# Patient Record
Sex: FEMALE | Race: BLACK OR AFRICAN AMERICAN | Employment: FULL TIME | URBAN - METROPOLITAN AREA
[De-identification: names, ages, dates, MRNs, and addresses within clinical notes are randomized per-mention and may not be internally consistent; named-entity substitution may affect disease eponyms.]

---

## 2018-06-22 ENCOUNTER — OFFICE VISIT (OUTPATIENT)
Dept: ORTHOPEDIC SURGERY | Facility: CLINIC | Age: 38
End: 2018-06-22

## 2018-06-22 VITALS
OXYGEN SATURATION: 97 % | HEART RATE: 90 BPM | SYSTOLIC BLOOD PRESSURE: 145 MMHG | TEMPERATURE: 97.9 F | WEIGHT: 231 LBS | DIASTOLIC BLOOD PRESSURE: 95 MMHG

## 2018-06-22 DIAGNOSIS — M22.2X1 PATELLOFEMORAL SYNDROME, BILATERAL: Primary | ICD-10-CM

## 2018-06-22 DIAGNOSIS — M72.2 PLANTAR FASCIITIS, LEFT: ICD-10-CM

## 2018-06-22 DIAGNOSIS — M22.2X2 PATELLOFEMORAL SYNDROME, BILATERAL: Primary | ICD-10-CM

## 2018-06-22 DIAGNOSIS — M76.892 PES ANSERINUS TENDINITIS OF LEFT LOWER EXTREMITY: ICD-10-CM

## 2018-06-22 NOTE — PROGRESS NOTES
Patient: Lisa Moran                MRN: [id number]       SSN: [id number]  YOB: 1980        AGE: 40 y.o. SEX: female  There is no height or weight on file to calculate BMI. PCP: No primary care provider on file.  06/22/18    Chief Complaint: Bilateral knee pain    HISTORY OF PRESENT ILLNESS:  Lisa Moran is a 44-year-old, female who comes to the office today for bilateral knee pain, left worse than right. She said that her left knee has been bothering her for about two weeks and her right knee for about two months. She denies any injury or trauma. Her knees hurt for when she is sitting for long periods of time or when she is going up and down stairs or getting up from a seated position. It also bothers her at work when she is walking around. In the left knee, she describes a deep pain as well as an anteromedial pain in her knee. In the right knee, it is a deep pain in the nape. She rates the left knee as 7/10, and the right knee is 4/10. She has not had any formal treatment. No physical therapy. No injections. No x-rays or MRIs. No past medical history on file. No family history on file. No Known Allergies    No past surgical history on file. Social History     Social History    Marital status:      Spouse name: N/A    Number of children: N/A    Years of education: N/A     Occupational History    Not on file.      Social History Main Topics    Smoking status: Never Smoker    Smokeless tobacco: Never Used    Alcohol use Not on file    Drug use: Not on file    Sexual activity: Not on file     Other Topics Concern    Not on file     Social History Narrative    No narrative on file       REVIEW OF SYSTEMS:      CON: negative for recent weight loss/gain, fever, or chills  EYE: negative for double or blurry vision  ENT: negative for hoarseness  RS:   negative for cough, URI, SOB  CV:  negative for chest pain, palpitations  GI: negative for blood in stool, nausea/vomiting  :  negative for blood in urine  MS: As per HPI  Other systems reviewed and noted below. PHYSICAL EXAMINATION:  Visit Vitals    BP (!) 145/95 (BP 1 Location: Left arm)    Pulse 90    Temp 97.9 °F (36.6 °C)    Wt 231 lb (104.8 kg)    SpO2 97%     There is no height or weight on file to calculate BMI. GENERAL: Alert and oriented x3, in no acute distress, well-developed, well-nourished. HEENT: Normocephalic, atraumatic. RESP: Non labored breathing with equal chest rise on inspiration. CV: Well perfused extremities. No cyanosis or clubbing noted. ABDOMEN: Soft, non-tender, non-distended. PHYSICAL EXAMINATION:  Right knee reveals full range of motion. No warmth or erythema or effusion. Mild crepitus with knee patella range of motion. No pain with patella compression, nontender over the mediolateral joint line. No instability is appreciated. Mild pain with patellar compression with knee flexion and extension. Left knee examination reveals mild tenderness to palpation over the medial joint line as well as the pes insertion. No crepitus with knee range of motion. No patella instability. Mild pain with patella compression as well as patella compression with knee range of motion. She has full knee range of motion without any instability. She is neurovascularly intact distally. Examination of the left foot reveals some tenderness to palpation over the plantar fascia. ASSESSMENT AND PLAN:  Yenifer Stoner is a 71-year-old female with bilateral patellofemoral syndrome, left knee pes insertional tendonitis as well as left plantar fasciitis. I have recommended physical therapy for these at todays date. I would not recommend any advanced imaging or x-rays at todays visit. I would like to see her back in about six weeks after physical therapy to see how she is doing.             Electronically signed by: Lauren Ontiveros MD

## 2018-07-02 ENCOUNTER — HOSPITAL ENCOUNTER (OUTPATIENT)
Dept: PHYSICAL THERAPY | Age: 38
Discharge: HOME | End: 2018-07-02
Payer: COMMERCIAL

## 2018-07-02 PROCEDURE — 97110 THERAPEUTIC EXERCISES: CPT | Performed by: PHYSICAL THERAPIST

## 2018-07-02 PROCEDURE — 97162 PT EVAL MOD COMPLEX 30 MIN: CPT | Performed by: PHYSICAL THERAPIST

## 2018-07-02 NOTE — PROGRESS NOTES
[facility] [address] [facility] [address], [address] [phone] fax    Plan of Care/ Statement of Necessity for Physical Therapy Services    Patient name: Cally Ambriz Start of Care: 2018   Referral source: Barbara Cadet MD : 1980    Medical Diagnosis: Plantar fascial fibromatosis [M72.2]  Left knee pain [M25.562]   Onset Date: 2 months    Treatment Diagnosis:  Right > Left Patellofemoral syndrome; left plantar fasciitis   Prior Hospitalization: see medical history Provider#: [id number]   Medications: Verified on Patient summary List    Comorbidities: BMI > 30   Prior Level of Function: (I) walking and squatting without pain         The Plan of Care and following information is based on the information from the initial evaluation. Assessment/ key information: 39 yo female with insidious onset left > right knee pain 2 months ago. About the same time she noted left heel pain that is worse upon waking and with prolonged sitting. Knee pain limits ability to walk for longer periods, negotiate stairs (25 steps in her house), and she is limited with ability to exercise to lose weight. Eval reveals good strength in both LEs with exception of weakness in bilateral hip extensors/adductors: 3+/5. AROM in left knee is 0-120 in supine versus rigth  0-125. Single limb squatting reveals valgus collapse on right. Decrease in overall pain with stretching alone so patient is a good candidate for skilled therapy to increase ROM and strength in both hips and knees to promote unlimited walking, stairs, squatting, and participation in regular ex program to lose weight.     Evaluation Complexity History MEDIUM  Complexity : 1-2 comorbidities / personal factors will impact the outcome/ POC ; Examination MEDIUM Complexity : 3 Standardized tests and measures addressing body structure, function, activity limitation and / or participation in recreation  ;Presentation LOW Complexity : Stable, uncomplicated  ;Clinical Decision Making MEDIUM Complexity : FOTO score of 26-74  Overall Complexity Rating: MEDIUM  Problem List: pain affecting function, decrease ROM, decrease strength, edema affecting function, impaired gait/ balance, decrease ADL/ functional abilitiies, decrease activity tolerance and decrease flexibility/ joint mobility   Treatment Plan may include any combination of the following: Therapeutic exercise, Therapeutic activities, Neuromuscular re-education, Physical agent/modality, Manual therapy and Patient education  Patient / Family readiness to learn indicated by: asking questions, trying to perform skills and interest  Persons(s) to be included in education: patient (P)  Barriers to Learning/Limitations: None  Patient Goal (s): to have no more pain  Patient Self Reported Health Status: fair  Rehabilitation Potential: good  Short Term Goals: To be accomplished in 1 weeks:  Patient will be (I) and compliant with simple HEP to restore flexibility in left ankle and strength in both knees to allow walking with no >4/10 pain  Status at last note: na  Long Term Goals: To be accomplished in 12-18 treatments:  1. Patient will be able to navigate up/down 25 steps using step over step pattern  in home with no more than 2/10 pain  Status at last note: limits going to 2nd floor (children's bedrooms on 2nd floor - must use step to step when painful  2. Patient will be have 0-125 AROM right knee to allow her to squat to pick things up off floor and sit comfortably  Status at last note: right knee AROM: 0-120 - unable to squat  3. Patient will report at least 75% improvement allowing her to participate in regular ex program to lose weight  Status at last note: na  4.   Patient will demonstrate an increase in FOTO score to at least 68 points to show functional gains in all areas  Status at last note:  FOTO: 47 points    Frequency / Duration: Patient to be seen 2-3  times per week for 12-18 treatments. Patient/ Caregiver education and instruction: Diagnosis, prognosis, self care, activity modification and exercises   [x]  Plan of care has been reviewed with PTA    The severity rating is based on clinical judgment and the FOTO score. Certification Period: nakia Galeano, PT 7/2/2018 6:30 PM    ________________________________________________________________________    I certify that the above Therapy Services are being furnished while the patient is under my care. I agree with the treatment plan and certify that this therapy is necessary.     Physician's Signature:____________________  Date:____________Time: _________    Please sign and return to [facility] [address] [place], [address] [phone] fax

## 2018-07-02 NOTE — PROGRESS NOTES
PT DAILY TREATMENT NOTE     Patient Name: Mimi Leahy  Date:2018  : 1980  [x]  Patient  Verified  Payor: Delena Boeck / Plan: Leticia Zhao / Product Type: HMO /    In time:4:05  Out time:4:55  Total Treatment Time (min): 50  Visit #: 1 of     Treatment Area: Plantar fascial fibromatosis [M72.2]  Left knee pain [M25.562]    SUBJECTIVE  Pain Level (0-10 scale): 5  Any medication changes, allergies to medications, adverse drug reactions, diagnosis change, or new procedure performed?: [x] No    [] Yes (see summary sheet for update)  Subjective functional status/changes:   [] No changes reported  See POC    OBJECTIVE    30 min [x]Eval                  []Re-Eval       10 min Therapeutic Exercise:  [] See flow sheet : instructed in HEP - see chart   Rationale: increase strength to improve the patients ability to walk with less pain            With   [] TE   [] TA   [] neuro   [] other: Patient Education: [x] Review HEP    [] Progressed/Changed HEP based on:   [] positioning   [] body mechanics   [] transfers   [] heat/ice application    [] other:      Other Objective/Functional Measures:  See POC    Pain Level (0-10 scale) post treatment: 3    ASSESSMENT/Changes in Function:      This is eval note -see POC    PLAN  []  Upgrade activities as tolerated     []  Continue plan of care  []  Update interventions per flow sheet       []  Discharge due to:_  [x]  Other:_      Florin Mccall, PT 2018  4:05 PM    Future Appointments  Date Time Provider Andrew Wolff   2018 3:15 PM Catherine Rowe MD [facility] [address]

## 2018-07-06 ENCOUNTER — HOSPITAL ENCOUNTER (OUTPATIENT)
Dept: PHYSICAL THERAPY | Age: 38
Discharge: HOME | End: 2018-07-06
Payer: COMMERCIAL

## 2018-07-06 PROCEDURE — 97110 THERAPEUTIC EXERCISES: CPT

## 2018-07-06 PROCEDURE — 97112 NEUROMUSCULAR REEDUCATION: CPT

## 2018-07-06 NOTE — PROGRESS NOTES
PT DAILY TREATMENT NOTE     Patient Name: Belia Zarate  Date:2018  : 1980  [x]  Patient  Verified  Payor: Joie Harmon / Plan: Drake Lozada / Product Type: HMO /    In time:730  Out time:807  Total Treatment Time (min): 37  Visit #: 2 of 12    Treatment Area: Plantar fascial fibromatosis [M72.2]  Left knee pain [M25.562]    SUBJECTIVE  Pain Level (0-10 scale): 0  Any medication changes, allergies to medications, adverse drug reactions, diagnosis change, or new procedure performed?: [x] No    [] Yes (see summary sheet for update)  Subjective functional status/changes:   [x] No changes reported      OBJECTIVE    27 min Therapeutic Exercise:  [x] See flow sheet :   Rationale: increase ROM and increase strength to improve the patients ability to perform ADL's. 10 min Neuromuscular Re-education:  [x]  See flow sheet :   Rationale: increase ROM, increase strength, improve coordination, improve balance and increase proprioception  to improve the patients ability to perform functional tasks. With   [x] TE   [] TA   [] neuro   [] other: Patient Education: [x] Review HEP    [] Progressed/Changed HEP based on:   [] positioning   [] body mechanics   [] transfers   [] heat/ice application    [] other:      Other Objective/Functional Measures: Met shortterm goal of Indepenenc and compliance with HEP. Pain Level (0-10 scale) post treatment: 0    ASSESSMENT/Changes in Function: Pt performed standing exercise with ease. She did require breaks due to fatigue with table exercises. Discomfort was noted with TRX squats and step down exercise.     Patient will continue to benefit from skilled PT services to modify and progress therapeutic interventions, address functional mobility deficits, address ROM deficits, address strength deficits, analyze and address soft tissue restrictions, analyze and cue movement patterns, analyze and modify body mechanics/ergonomics, assess and modify postural abnormalities and address imbalance/dizziness to attain remaining goals. [x]  See Plan of Care  []  See progress note/recertification  []  See Discharge Summary         Progress towards goals / Updated goals:  Short Term Goals: To be accomplished in 1 weeks:  Patient will be (I) and compliant with simple HEP to restore flexibility in left ankle and strength in both knees to allow walking with no >4/10 pain  MET 7/6/2018  Status at last note: na  Long Term Goals: To be accomplished in 12-18 treatments:  1. Patient will be able to navigate up/down 25 steps using step over step pattern  in home with no more than 2/10 pain  Status at last note: limits going to 2nd floor (children's bedrooms on 2nd floor - must use step to step when painful  2. Patient will be have 0-125 AROM right knee to allow her to squat to pick things up off floor and sit comfortably  Status at last note: right knee AROM: 0-120 - unable to squat  3. Patient will report at least 75% improvement allowing her to participate in regular ex program to lose weight  Status at last note: na  4.   Patient will demonstrate an increase in FOTO score to at least 68 points to show functional gains in all areas  Status at last note:  FOTO: 47 points    PLAN  []  Upgrade activities as tolerated     [x]  Continue plan of care  []  Update interventions per flow sheet       []  Discharge due to:_  []  Other:_      Dee Cain 7/6/2018  8:10 AM    Future Appointments  Date Time Provider Andrew Wolff   7/11/2018 4:30 PM Juliette Byrd, PT [facility]   7/13/2018 5:00 PM Jimi Causey PTA [facility]   7/17/2018 5:00 PM Juliette Byrd PT [facility]   7/20/2018 4:30 PM Jimi Causey PTA [facility]   7/24/2018 7:30 AM Meena Larry PT [facility]   7/26/2018 4:30 PM Jimi Causey PTA [facility]   7/31/2018 7:30 AM [facility]   8/24/2018 3:15 PM MD [facility] [address]

## 2018-07-11 ENCOUNTER — APPOINTMENT (OUTPATIENT)
Dept: PHYSICAL THERAPY | Age: 38
End: 2018-07-11
Payer: COMMERCIAL

## 2018-07-13 ENCOUNTER — HOSPITAL ENCOUNTER (OUTPATIENT)
Dept: PHYSICAL THERAPY | Age: 38
Discharge: HOME | End: 2018-07-13
Payer: COMMERCIAL

## 2018-07-13 PROCEDURE — 97112 NEUROMUSCULAR REEDUCATION: CPT

## 2018-07-13 PROCEDURE — 97110 THERAPEUTIC EXERCISES: CPT

## 2018-07-13 NOTE — PROGRESS NOTES
PT DAILY TREATMENT NOTE     Patient Name: Brittani Montalvo  Date:2018  : 1980  [x]  Patient  Verified  Payor: Claudeen Lao / Plan: Yulissa Johns / Product Type: HMO /    In time:459  Out time:524  Total Treatment Time (min): 25  Visit #: 3 of 12    Treatment Area: Plantar fascial fibromatosis [M72.2]  Left knee pain [M25.562]    SUBJECTIVE  Pain Level (0-10 scale): 1  Any medication changes, allergies to medications, adverse drug reactions, diagnosis change, or new procedure performed?: [x] No    [] Yes (see summary sheet for update)  Subjective functional status/changes:   [] No changes reported  \"I hit my knee on my desk at work today. \"    OBJECTIVE    Modality rationale: patient declined   Min Type Additional Details    [] Estim:  []Unatt       []IFC  []Premod                        []Other:  []w/ice   []w/heat  Position:  Location:    [] Estim: []Att    []TENS instruct  []NMES                    []Other:  []w/US   []w/ice   []w/heat  Position:  Location:    []  Traction: [] Cervical       []Lumbar                       [] Prone          []Supine                       []Intermittent   []Continuous Lbs:  [] before manual  [] after manual    []  Ultrasound: []Continuous   [] Pulsed                           []1MHz   []3MHz W/cm2:  Location:    []  Iontophoresis with dexamethasone         Location: [] Take home patch   [] In clinic    []  Ice     []  heat  []  Ice massage  []  Laser   []  Anodyne Position:  Location:    []  Laser with stim  []  Other:  Position:  Location:    []  Vasopneumatic Device Pressure:       [] lo [] med [] hi   Temperature: [] lo [] med [] hi   [] Skin assessment post-treatment:  []intact []redness- no adverse reaction    []redness  adverse reaction:     10 min Therapeutic Exercise:  [x] See flow sheet :   Rationale: increase ROM and increase strength to improve the patients ability to perform ADLs    15 min Neuromuscular Re-education:  [x]  See flow sheet : quad re-ed activities    Rationale: increase ROM, increase strength, improve coordination, improve balance and increase proprioception  to improve the patients ability to improve mobility, stance stability, and gait        With   [x] TE   [] TA   [x] neuro   [] other: Patient Education: [x] Review HEP    [] Progressed/Changed HEP based on:   [x] positioning   [x] body mechanics   [] transfers   [] heat/ice application    [] other:      Other Objective/Functional Measures:      Pain Level (0-10 scale) post treatment: 0    ASSESSMENT/Changes in Function: Pt is making great progress with her pain and ROM. She demonstrated good squatting mechanics. Patient will continue to benefit from skilled PT services to modify and progress therapeutic interventions, address functional mobility deficits, address ROM deficits, address strength deficits, analyze and address soft tissue restrictions, analyze and cue movement patterns, analyze and modify body mechanics/ergonomics, assess and modify postural abnormalities, address imbalance/dizziness and instruct in home and community integration to attain remaining goals. [x]  See Plan of Care  []  See progress note/recertification  []  See Discharge Summary         Progress towards goals / Updated goals:  Short Term Goals: To be accomplished in 1 weeks:  1. Patient will be (I) and compliant with simple HEP to restore flexibility in left ankle and strength in both knees to allow walking with no >4/10 pain   Status at last note: na   MET  Long Term Goals: To be accomplished in 12-18 treatments:  1.  Patient will be able to navigate up/down 25 steps using step over step pattern  in home with no more than 2/10 pain   Status at last note: limits going to 2nd floor (children's bedrooms on 2nd floor - must use step to step when painful   Improving with steps in the clinic  2. Raul Briseno will be have 0-125 AROM right knee to allow her to squat to pick things up off floor and sit comfortably   Status at last note: right knee AROM: 0-120 - unable to squat   Improving with squatting  3.  Patient will report at least 75% improvement allowing her to participate in regular ex program to lose weight   Status at last note: na   Assess at later visit  4.  Patient will demonstrate an increase in FOTO score to at least 68 points to show functional gains in all areas   Status at last note:  FOTO: 47 points   Assess at 5th visit    PLAN  []  Upgrade activities as tolerated     [x]  Continue plan of care  []  Update interventions per flow sheet       []  Discharge due to:_  []  Other:_      Jimi Causey PTA, CSCS 7/13/2018  5:28 PM    Future Appointments  Date Time Provider Andrew Casarezi   7/13/2018 5:00 PM Jimi Causey PTA [facility]   7/17/2018 5:00 PM Juliette Byrd, PT [facility]   7/20/2018 4:30 PM Jimi Causey PTA [facility]   7/24/2018 7:30 AM Meena Larry, PT [facility]   7/26/2018 4:30 PM Jimi Causey PTA [facility]   7/31/2018 7:30 AM Meena Larry, PT [facility]   8/24/2018 3:15 PM Janneth Odom MD [facility] [address] [address]

## 2018-07-17 ENCOUNTER — HOSPITAL ENCOUNTER (OUTPATIENT)
Dept: PHYSICAL THERAPY | Age: 38
Discharge: HOME | End: 2018-07-17
Payer: COMMERCIAL

## 2018-07-17 PROCEDURE — 97112 NEUROMUSCULAR REEDUCATION: CPT | Performed by: PHYSICAL THERAPIST

## 2018-07-17 PROCEDURE — 97110 THERAPEUTIC EXERCISES: CPT | Performed by: PHYSICAL THERAPIST

## 2018-07-17 NOTE — PROGRESS NOTES
PT DAILY TREATMENT NOTE     Patient Name: Krysten Necessary  Date:2018  : 1980  [x]  Patient  Verified  Payor: Roberto Vasquez / Plan: Isamar Torres / Product Type: HMO /    In time:512  Out time:543  Total Treatment Time (min): 31  Visit #: 4 of     Treatment Area: Plantar fascial fibromatosis [M72.2]  Left knee pain [M25.562]    SUBJECTIVE  Pain Level (0-10 scale): 0  Any medication changes, allergies to medications, adverse drug reactions, diagnosis change, or new procedure performed?: [x] No    [] Yes (see summary sheet for update)  Subjective functional status/changes:   [x] No changes reported      OBJECTIVE    21 min Therapeutic Exercise:  [] See flow sheet :   Rationale: increase ROM, increase strength, improve coordination, improve balance and increase proprioception to improve the patients ability to tolerate weightbearing tasks with improved mobility and stability. 10 min Neuromuscular Re-eduation:  [] See flow sheet :   Rationale: increase ROM, increase strength, improve coordination, improve balance and increase proprioception to improve the patients ability to tolerate weightbearing tasks with improved mobility and stability. With   [x] TE   [] TA   [] neuro   [] other: Patient Education: [x] Review HEP    [] Progressed/Changed HEP based on:   [] positioning   [] body mechanics   [] transfers   [] heat/ice application    [] other:      Other Objective/Functional Measures: Pt requires vC for new therapy tasks. Pain Level (0-10 scale) post treatment: 0    ASSESSMENT/Changes in Function: Pt tolerates session with no c/o pain during/throughout therapy tasks. Pt continues to progress with activity tolerance and R LE strength/stability.     Patient will continue to benefit from skilled PT services to modify and progress therapeutic interventions, address functional mobility deficits, address ROM deficits, address strength deficits, analyze and address soft tissue restrictions, analyze and cue movement patterns, analyze and modify body mechanics/ergonomics and assess and modify postural abnormalities to attain remaining goals. Progress towards goals / Updated goals:  Short Term Goals: To be accomplished in 1 weeks:  1. Patient will be (I) and compliant with simple HEP to restore flexibility in left ankle and strength in both knees to allow walking with no >4/10 pain                        Status at last note: na                        MET  Long Term Goals: To be accomplished in 12-18 treatments:  1.  Patient will be able to navigate up/down 25 steps using step over step pattern  in home with no more than 2/10 pain                        Status at last note: limits going to 2nd floor (children's bedrooms on 2nd floor - must use step to step when painful                        Improving with steps in the clinic  2. Michelle Anguiano will be have 0-125 AROM right knee to allow her to squat to pick things up off floor and sit comfortably                        Status at last note: right knee AROM: 0-120 - unable to squat                        Improving with squatting  3.  Patient will report at least 75% improvement allowing her to participate in regular ex program to lose weight                        Status at last note: na                        Assess at later visit  4.  Patient will demonstrate an increase in FOTO score to at least 68 points to show functional gains in all areas                        Status at last note:  FOTO: 47 points                        Assess at 5th visit       PLAN  []  Upgrade activities as tolerated     [x]  Continue plan of care  []  Update interventions per flow sheet       []  Discharge due to:_  []  Other:_      Mckayla Andrade, PT 7/17/2018  5:49 PM    Future Appointments  Date Time Provider Andrew Wolff   7/20/2018 4:30 PM Gustabo Acevedo PTA [facility] [address]   7/24/2018 7:30 AM Michelle Adames PT [facility] [address]   7/26/2018 4:30 PM Zain Andres, PTA [facility]   7/31/2018 7:30 AM [facility]   8/24/2018 3:15 PM Art Malone MD [facility] [place] [address]

## 2018-07-20 ENCOUNTER — HOSPITAL ENCOUNTER (OUTPATIENT)
Dept: PHYSICAL THERAPY | Age: 38
Discharge: HOME | End: 2018-07-20
Payer: COMMERCIAL

## 2018-07-20 PROCEDURE — 97110 THERAPEUTIC EXERCISES: CPT | Performed by: PHYSICAL THERAPIST

## 2018-07-20 NOTE — PROGRESS NOTES
PT DAILY TREATMENT NOTE     Patient Name: Leigha Garvin  Date:2018  : 1980  [x]  Patient  Verified  Payor: Brandon Choudhary / Plan: Linda Rivera / Product Type: HMO /    In time:442  Out time:504  Total Treatment Time (min): 22  Visit #: 5 of     Treatment Area: Plantar fascial fibromatosis [M72.2]  Left knee pain [M25.562]    SUBJECTIVE  Pain Level (0-10 scale): 0  Any medication changes, allergies to medications, adverse drug reactions, diagnosis change, or new procedure performed?: [x] No    [] Yes (see summary sheet for update)  Subjective functional status/changes:   [x] No changes reported        OBJECTIVE     22 min Therapeutic Exercise:  [] See flow sheet :   Rationale: increase ROM, increase strength, improve coordination, improve balance and increase proprioception to improve the patients ability to tolerate weightbearing tasks with improved mobility and stability.        With   [x] TE   [] TA   [] neuro   [] other: Patient Education: [x] Review HEP    [] Progressed/Changed HEP based on:   [] positioning   [] body mechanics   [] transfers   [] heat/ice application    [] other:       Other Objective/Functional Measures: Pt arrives 12 minutes late to therapy session.              Pain Level (0-10 scale) post treatment: 0     ASSESSMENT/Changes in Function: Pt tolerates session well with no c/o pain at end of visit today. Exercises held/reps reduced today secondary to pt's late arrival time and time constraints with schedule. Will progress reps for next visit to increase difficulty.       Patient will continue to benefit from skilled PT services to modify and progress therapeutic interventions, address functional mobility deficits, address ROM deficits, address strength deficits, analyze and address soft tissue restrictions, analyze and cue movement patterns, analyze and modify body mechanics/ergonomics and assess and modify postural abnormalities to attain remaining goals.         Progress towards goals / Updated goals:  Short Term Goals: To be accomplished in 1 weeks:  1. Patient will be (I) and compliant with simple HEP to restore flexibility in left ankle and strength in both knees to allow walking with no >4/10 pain                        Status at last note: na                        MET  Long Term Goals: To be accomplished in 12-18 treatments:  1.  Patient will be able to navigate up/down 25 steps using step over step pattern  in home with no more than 2/10 pain                        Status at last note: limits going to 2nd floor (children's bedrooms on 2nd floor - must use step to step when painful                        Improving with steps in the clinic  2. Sweta Salcido will be have 0-125 AROM right knee to allow her to squat to pick things up off floor and sit comfortably                        Status at last note: right knee AROM: 0-120 - unable to squat                        Improving with squatting  3.  Patient will report at least 75% improvement allowing her to participate in regular ex program to lose weight                        Status at last note: na                        Assess at later visit  4.  Patient will demonstrate an increase in FOTO score to at least 68 points to show functional gains in all areas                        Status at last note:  FOTO: 47 points                        Assess at 5th visit    PLAN  []  Upgrade activities as tolerated     [x]  Continue plan of care  []  Update interventions per flow sheet       []  Discharge due to:_  []  Other:_      Eloy Woodall, PT 7/20/2018  5:08 PM    Future Appointments  Date Time Provider Andrew oWlff   7/24/2018 7:30 AM [facility]   7/26/2018 4:30 PM Mario Milner PTA [facility]   7/31/2018 7:30 AM Niranjan Barrera, PT [facility]   8/24/2018 3:15 PM Beryle Primmer, MD [facility]

## 2018-07-24 ENCOUNTER — HOSPITAL ENCOUNTER (OUTPATIENT)
Dept: PHYSICAL THERAPY | Age: 38
Discharge: HOME | End: 2018-07-24
Payer: COMMERCIAL

## 2018-07-24 PROCEDURE — 97112 NEUROMUSCULAR REEDUCATION: CPT

## 2018-07-24 PROCEDURE — 97110 THERAPEUTIC EXERCISES: CPT

## 2018-07-24 NOTE — PROGRESS NOTES
PT DAILY TREATMENT NOTE 12    Patient Name: Gage Russell  Date:2018  : 1980  [x]  Patient  Verified  Payor: Ej Feng / Plan: Jackie Dodd / Product Type: HMO /    In time:730  Out time:809  Total Treatment Time (min): 39  Visit #:  6 of 12    Treatment Area: Plantar fascial fibromatosis [M72.2]  Left knee pain [M25.562]    SUBJECTIVE  Pain Level (0-10 scale): 0  Any medication changes, allergies to medications, adverse drug reactions, diagnosis change, or new procedure performed?: [x] No    [] Yes (see summary sheet for update)  Subjective functional status/changes:   [x] No changes reported      OBJECTIVE    24 min Therapeutic Exercise:  [x] See flow sheet :   Rationale: increase ROM and increase strength to improve the patients ability to perform ADL's. 15 min Neuromuscular Re-education:  [x]  See flow sheet :   Rationale: increase ROM, increase strength, improve coordination, improve balance and increase proprioception  to improve the patients ability to perform functional tasks. With   [x] TE   [] TA   [] neuro   [] other: Patient Education: [x] Review HEP    [] Progressed/Changed HEP based on:   [] positioning   [] body mechanics   [] transfers   [] heat/ice application    [] other:      Other Objective/Functional Measures: Patient reported 60-70% improvement     Pain Level (0-10 scale) post treatment: 0    ASSESSMENT/Changes in Function: Pt is continuing to demonstrate improvement from session to session. Her pain is becoming better controlled, and showing improvement in proprioception and coordination.     Patient will continue to benefit from skilled PT services to modify and progress therapeutic interventions, address functional mobility deficits, address ROM deficits, address strength deficits, analyze and address soft tissue restrictions, analyze and cue movement patterns, analyze and modify body mechanics/ergonomics, assess and modify postural abnormalities and address imbalance/dizziness to attain remaining goals. [x]  See Plan of Care  []  See progress note/recertification  []  See Discharge Summary         Progress towards goals / Updated goals:  Short Term Goals: To be accomplished in 1 weeks:  1. Patient will be (I) and compliant with simple HEP to restore flexibility in left ankle and strength in both knees to allow walking with no >4/10 pain                        Status at last note: na                        MET  Long Term Goals: To be accomplished in 12-18 treatments:  1.  Patient will be able to navigate up/down 25 steps using step over step pattern  in home with no more than 2/10 pain                        Status at last note: limits going to 2nd floor (children's bedrooms on 2nd floor - must use step to step when painful                        Improving with steps in the clinic  2. Arie Jenkins will be have 0-125 AROM right knee to allow her to squat to pick things up off floor and sit comfortably                        Status at last note: right knee AROM: 0-120 - unable to squat                        Improving with squatting  3.  Patient will report at least 75% improvement allowing her to participate in regular ex program to lose weight                        Status at last note: 60-70%                        Assess at later visit  4.  Patient will demonstrate an increase in FOTO score to at least 68 points to show functional gains in all areas                        Status at last note:  FOTO: 47 points                        Assess at 5th visit       PLAN  []  Upgrade activities as tolerated     [x]  Continue plan of care  []  Update interventions per flow sheet       []  Discharge due to:_  []  Other:_      Eligio Solis 7/24/2018  7:49 AM    Future Appointments  Date Time Provider Andrew Wolff   7/26/2018 4:30 PM [facility]   7/31/2018 7:30 AM [facility]   8/24/2018 3:15 PM Art Malone MD Carole Lara

## 2018-07-31 ENCOUNTER — HOSPITAL ENCOUNTER (OUTPATIENT)
Dept: PHYSICAL THERAPY | Age: 38
Discharge: HOME | End: 2018-07-31
Payer: COMMERCIAL

## 2018-07-31 PROCEDURE — 97110 THERAPEUTIC EXERCISES: CPT

## 2018-07-31 PROCEDURE — 97112 NEUROMUSCULAR REEDUCATION: CPT

## 2018-07-31 NOTE — PROGRESS NOTES
PT DAILY TREATMENT NOTE     Patient Name: Vj Leiva  Date:2018  : 1980  [x]  Patient  Verified  Payor: Shaila Laws / Plan: Ronald Braswell / Product Type: HMO /    In time:730  Out time:809  Total Treatment Time (min): 39  Visit #: 7 of     Treatment Area: Plantar fascial fibromatosis [M72.2]  Left knee pain [M25.562]    SUBJECTIVE  Pain Level (0-10 scale): 0  Any medication changes, allergies to medications, adverse drug reactions, diagnosis change, or new procedure performed?: [x] No    [] Yes (see summary sheet for update)  Subjective functional status/changes:   [x] No changes reported    OBJECTIVE    10 min Therapeutic Exercise:  [x] See flow sheet :   Rationale: increase ROM and increase strength to improve the patients ability to perform, ADL's.    29 min Neuromuscular Re-education:  [x]  See flow sheet :   Rationale: increase ROM, increase strength, improve coordination and increase proprioception  to improve the patients ability to perform functional tasks. With   [x] TE   [] TA   [] neuro   [] other: Patient Education: [x] Review HEP    [] Progressed/Changed HEP based on:   [] positioning   [] body mechanics   [] transfers   [] heat/ice application    [] other:      Other Objective/Functional Measures: right knee AROM: 0-130     Pain Level (0-10 scale) post treatment: 0    ASSESSMENT/Changes in Function: Pt is showing improvement with strength, neuromuscular control, and stair climbing. She has improvement with activity tolerance and pain control. Patient will continue to benefit from skilled PT services to modify and progress therapeutic interventions, address functional mobility deficits, address ROM deficits, address strength deficits, analyze and address soft tissue restrictions, analyze and cue movement patterns, analyze and modify body mechanics/ergonomics and address imbalance/dizziness to attain remaining goals.      [x]  See Plan of Care  []  See progress note/recertification  []  See Discharge Summary         Progress towards goals / Updated goals:  Short Term Goals: To be accomplished in 1 weeks:  1. Patient will be (I) and compliant with simple HEP to restore flexibility in left ankle and strength in both knees to allow walking with no >4/10 pain                        Status at last note: na                        MET  Long Term Goals: To be accomplished in 12-18 treatments:  1.  Patient will be able to navigate up/down 25 steps using step over step pattern  in home with no more than 2/10 pain                        Status at last note: limits going to 2nd floor (children's bedrooms on 2nd floor - must use step to step when painful                        Improving with steps in the clinic  2. Alia Ceron will be have 0-125 AROM right knee to allow her to squat to pick things up off floor and sit comfortably                        Status at last note: right knee AROM: 0-130                         Improving with squatting  3.  Patient will report at least 75% improvement allowing her to participate in regular ex program to lose weight                        Status at last note: 60-70%                        Assess at later visit  4.  Patient will demonstrate an increase in FOTO score to at least 68 points to show functional gains in all areas                        Status at last note:  FOTO: 47 points                        Assess at 5th visit    PLAN  []  Upgrade activities as tolerated     [x]  Continue plan of care  []  Update interventions per flow sheet       []  Discharge due to:_  []  Other:_      Helgaon Nelly 7/31/2018  7:38 AM    Future Appointments  Date Time Provider Andrew Wolff   8/24/2018 3:15 PM Monique Dandy, MD [facility] [place] [facility]

## 2018-08-23 NOTE — PROGRESS NOTES
[facility]  [address], [address] [phone] fax    Discharge Summary  Patient name: Yaya Urbina Start of Care: 2018   Referral source: Nicci Shen MD : 1980                         Medical Diagnosis: Plantar fascial fibromatosis [M72.2]  Left knee pain [M25.562] Onset Date: 2 months                         Treatment Diagnosis:  Right > Left Patellofemoral syndrome; left plantar fasciitis   Prior Hospitalization: see medical history Provider#: [id number]   Medications: Verified on Patient summary List    Comorbidities: BMI > 30   Prior Level of Function: (I) walking and squatting without pain      Visits from Start of Care: 7    Missed Visits: 2    Reporting Period : 18 to 18    Progress towards goals  Short Term Goals: To be accomplished in 1 weeks:  1. Patient will be (I) and compliant with simple HEP to restore flexibility in left ankle and strength in both knees to allow walking with no >4/10 pain                        Status at last note: na                        MET  Long Term Goals: To be accomplished in 12-18 treatments:  1.  Patient will be able to navigate up/down 25 steps using step over step pattern  in home with no more than 2/10 pain                        Status at last note: limits going to 2nd floor (children's bedrooms on 2nd floor - must use step to step when painful                          unable to reassess  2.  Patient will be have 0-125 AROM right knee to allow her to squat to pick things up off floor and sit comfortably                        Status at last note: right knee AROM: 0-130                         met  3.  Patient will report at least 75% improvement allowing her to participate in regular ex program to lose weight                        Status at last note: 60-70%                        unable to reassess  4.  Patient will demonstrate an increase in FOTO score to at least 68 points to show functional gains in all areas                        Status at last note:  FOTO: 47 points                        unable to reassess    Assessment/Summary of care: ms. Cruz was evaluated and seen for 7 treatment sessions total for L knee and plantar fasciitis. She was last seen in our office 7/31/18, at which point she displayed full ROM of B knees and reported 0/10 pain rating. Ms. Josy Lorenzo has not since f/u with our office, and current status is unknown. We will discharge from outpatient PT services at this time with f/u to MD for updated order should she require additional therapy.     RECOMMENDATIONS:  [x]Discontinue therapy: []Patient has reached or is progressing toward set goals      [x]Patient is non-compliant or has abdicated      []Due to lack of appreciable progress towards set [address], PT 8/23/2018 11:31 AM

## 2024-01-26 RX ORDER — LOSARTAN POTASSIUM 50 MG/1
50 TABLET ORAL DAILY
COMMUNITY
End: 2024-01-29 | Stop reason: SDUPTHER

## 2024-01-27 SDOH — HEALTH STABILITY: PHYSICAL HEALTH: ON AVERAGE, HOW MANY MINUTES DO YOU ENGAGE IN EXERCISE AT THIS LEVEL?: 20 MIN

## 2024-01-27 SDOH — HEALTH STABILITY: PHYSICAL HEALTH: ON AVERAGE, HOW MANY DAYS PER WEEK DO YOU ENGAGE IN MODERATE TO STRENUOUS EXERCISE (LIKE A BRISK WALK)?: 2 DAYS

## 2024-01-29 ENCOUNTER — OFFICE VISIT (OUTPATIENT)
Facility: CLINIC | Age: 44
End: 2024-01-29
Payer: COMMERCIAL

## 2024-01-29 ENCOUNTER — HOSPITAL ENCOUNTER (OUTPATIENT)
Facility: HOSPITAL | Age: 44
Setting detail: SPECIMEN
Discharge: HOME | End: 2024-02-01
Payer: COMMERCIAL

## 2024-01-29 VITALS
TEMPERATURE: 98.1 F | OXYGEN SATURATION: 98 % | WEIGHT: 231 LBS | BODY MASS INDEX: 39.44 KG/M2 | DIASTOLIC BLOOD PRESSURE: 86 MMHG | HEART RATE: 93 BPM | SYSTOLIC BLOOD PRESSURE: 130 MMHG | RESPIRATION RATE: 16 BRPM | HEIGHT: 64 IN

## 2024-01-29 DIAGNOSIS — L91.8 SKIN TAG: ICD-10-CM

## 2024-01-29 DIAGNOSIS — E66.9 OBESITY (BMI 30-39.9): ICD-10-CM

## 2024-01-29 DIAGNOSIS — Z13.21 ENCOUNTER FOR SCREENING FOR NUTRITIONAL DISORDER: ICD-10-CM

## 2024-01-29 DIAGNOSIS — Z13.1 SCREENING FOR DIABETES MELLITUS: ICD-10-CM

## 2024-01-29 DIAGNOSIS — Z12.31 SCREENING MAMMOGRAM FOR HIGH-RISK PATIENT: ICD-10-CM

## 2024-01-29 DIAGNOSIS — I10 BENIGN ESSENTIAL HTN: Primary | ICD-10-CM

## 2024-01-29 DIAGNOSIS — Z13.29 SCREENING FOR ENDOCRINE DISORDER: ICD-10-CM

## 2024-01-29 DIAGNOSIS — I10 BENIGN ESSENTIAL HTN: ICD-10-CM

## 2024-01-29 DIAGNOSIS — Z13.0 SCREENING FOR DEFICIENCY ANEMIA: ICD-10-CM

## 2024-01-29 DIAGNOSIS — Z13.220 ENCOUNTER FOR SCREENING FOR LIPID DISORDER: ICD-10-CM

## 2024-01-29 DIAGNOSIS — Z76.0 MEDICATION REFILL: ICD-10-CM

## 2024-01-29 DIAGNOSIS — E55.9 VITAMIN D DEFICIENCY: Primary | ICD-10-CM

## 2024-01-29 LAB
25(OH)D3 SERPL-MCNC: 9.6 NG/ML (ref 30–100)
ALBUMIN SERPL-MCNC: 3.8 G/DL (ref 3.4–5)
ALBUMIN/GLOB SERPL: 1.1 (ref 0.8–1.7)
ALP SERPL-CCNC: 72 U/L (ref 45–117)
ALT SERPL-CCNC: 22 U/L (ref 13–56)
ANION GAP SERPL CALC-SCNC: 6 MMOL/L (ref 3–18)
AST SERPL-CCNC: 11 U/L (ref 10–38)
BASOPHILS # BLD: 0 K/UL (ref 0–0.1)
BASOPHILS NFR BLD: 0 % (ref 0–2)
BILIRUB SERPL-MCNC: 0.5 MG/DL (ref 0.2–1)
BUN SERPL-MCNC: 13 MG/DL (ref 7–18)
BUN/CREAT SERPL: 13 (ref 12–20)
CALCIUM SERPL-MCNC: 9.3 MG/DL (ref 8.5–10.1)
CHLORIDE SERPL-SCNC: 108 MMOL/L (ref 100–111)
CHOLEST SERPL-MCNC: 209 MG/DL
CO2 SERPL-SCNC: 24 MMOL/L (ref 21–32)
CREAT SERPL-MCNC: 0.99 MG/DL (ref 0.6–1.3)
DIFFERENTIAL METHOD BLD: ABNORMAL
EOSINOPHIL # BLD: 0.1 K/UL (ref 0–0.4)
EOSINOPHIL NFR BLD: 1 % (ref 0–5)
ERYTHROCYTE [DISTWIDTH] IN BLOOD BY AUTOMATED COUNT: 13.4 % (ref 11.6–14.5)
EST. AVERAGE GLUCOSE BLD GHB EST-MCNC: 123 MG/DL
GLOBULIN SER CALC-MCNC: 3.4 G/DL (ref 2–4)
GLUCOSE SERPL-MCNC: 95 MG/DL (ref 74–99)
HBA1C MFR BLD: 5.9 % (ref 4.2–5.6)
HCT VFR BLD AUTO: 45.5 % (ref 35–45)
HDLC SERPL-MCNC: 55 MG/DL (ref 40–60)
HDLC SERPL: 3.8 (ref 0–5)
HGB BLD-MCNC: 14.7 G/DL (ref 12–16)
IMM GRANULOCYTES # BLD AUTO: 0 K/UL (ref 0–0.04)
IMM GRANULOCYTES NFR BLD AUTO: 0 % (ref 0–0.5)
LDLC SERPL CALC-MCNC: 133.8 MG/DL (ref 0–100)
LIPID PANEL: ABNORMAL
LYMPHOCYTES # BLD: 2.2 K/UL (ref 0.9–3.6)
LYMPHOCYTES NFR BLD: 35 % (ref 21–52)
MCH RBC QN AUTO: 30.9 PG (ref 24–34)
MCHC RBC AUTO-ENTMCNC: 32.3 G/DL (ref 31–37)
MCV RBC AUTO: 95.8 FL (ref 78–100)
MONOCYTES # BLD: 0.3 K/UL (ref 0.05–1.2)
MONOCYTES NFR BLD: 5 % (ref 3–10)
NEUTS SEG # BLD: 3.7 K/UL (ref 1.8–8)
NEUTS SEG NFR BLD: 58 % (ref 40–73)
NRBC # BLD: 0 K/UL (ref 0–0.01)
NRBC BLD-RTO: 0 PER 100 WBC
PLATELET # BLD AUTO: 349 K/UL (ref 135–420)
PMV BLD AUTO: 10.2 FL (ref 9.2–11.8)
POTASSIUM SERPL-SCNC: 4.5 MMOL/L (ref 3.5–5.5)
PROT SERPL-MCNC: 7.2 G/DL (ref 6.4–8.2)
RBC # BLD AUTO: 4.75 M/UL (ref 4.2–5.3)
SODIUM SERPL-SCNC: 138 MMOL/L (ref 136–145)
T4 FREE SERPL-MCNC: 1.1 NG/DL (ref 0.7–1.5)
TRIGL SERPL-MCNC: 101 MG/DL
TSH SERPL DL<=0.05 MIU/L-ACNC: 1.89 UIU/ML (ref 0.36–3.74)
VLDLC SERPL CALC-MCNC: 20.2 MG/DL
WBC # BLD AUTO: 6.4 K/UL (ref 4.6–13.2)

## 2024-01-29 PROCEDURE — 82306 VITAMIN D 25 HYDROXY: CPT

## 2024-01-29 PROCEDURE — 80053 COMPREHEN METABOLIC PANEL: CPT

## 2024-01-29 PROCEDURE — 99204 OFFICE O/P NEW MOD 45 MIN: CPT | Performed by: FAMILY MEDICINE

## 2024-01-29 PROCEDURE — 83036 HEMOGLOBIN GLYCOSYLATED A1C: CPT

## 2024-01-29 PROCEDURE — 84443 ASSAY THYROID STIM HORMONE: CPT

## 2024-01-29 PROCEDURE — 84439 ASSAY OF FREE THYROXINE: CPT

## 2024-01-29 PROCEDURE — 80061 LIPID PANEL: CPT

## 2024-01-29 PROCEDURE — 3079F DIAST BP 80-89 MM HG: CPT | Performed by: FAMILY MEDICINE

## 2024-01-29 PROCEDURE — 36415 COLL VENOUS BLD VENIPUNCTURE: CPT

## 2024-01-29 PROCEDURE — 85025 COMPLETE CBC W/AUTO DIFF WBC: CPT

## 2024-01-29 PROCEDURE — 3075F SYST BP GE 130 - 139MM HG: CPT | Performed by: FAMILY MEDICINE

## 2024-01-29 RX ORDER — LOSARTAN POTASSIUM 50 MG/1
50 TABLET ORAL DAILY
Qty: 90 TABLET | Refills: 1 | Status: SHIPPED | OUTPATIENT
Start: 2024-01-29

## 2024-01-29 RX ORDER — ERGOCALCIFEROL 1.25 MG/1
50000 CAPSULE ORAL WEEKLY
Qty: 12 CAPSULE | Refills: 1 | Status: SHIPPED | OUTPATIENT
Start: 2024-01-29

## 2024-01-29 ASSESSMENT — ENCOUNTER SYMPTOMS
SHORTNESS OF BREATH: 0
BLURRED VISION: 0
BACK PAIN: 1

## 2024-01-29 ASSESSMENT — PATIENT HEALTH QUESTIONNAIRE - PHQ9
SUM OF ALL RESPONSES TO PHQ QUESTIONS 1-9: 0
SUM OF ALL RESPONSES TO PHQ QUESTIONS 1-9: 0
2. FEELING DOWN, DEPRESSED OR HOPELESS: 0
1. LITTLE INTEREST OR PLEASURE IN DOING THINGS: 0
SUM OF ALL RESPONSES TO PHQ QUESTIONS 1-9: 0
SUM OF ALL RESPONSES TO PHQ9 QUESTIONS 1 & 2: 0
SUM OF ALL RESPONSES TO PHQ QUESTIONS 1-9: 0

## 2024-01-29 NOTE — PROGRESS NOTES
Demetrius Velazquez (:  1980) is a 43 y.o. female,New patient, here for evaluation of the following chief complaint(s):  Hypertension, Back Pain (resolved), and New Patient         ASSESSMENT/PLAN:  1. Benign essential HTN--  Controlled. Continue current meds and doses.    Modify diet.  Limit salt intake to 2 grams  a day.  Advised  aerobic exercise for 30 minutes 3 to 5 times a week.  Take meds consistently as prescribed.      -     losartan (COZAAR) 50 MG tablet; Take 1 tablet by mouth daily, Disp-90 tablet, R-1Normal  -     Comprehensive Metabolic Panel; Future  -     Lipid Panel; Future  -     TSH + Free T4 Panel; Future  2. Screening for diabetes mellitus  -     Hemoglobin A1C; Future  3. Encounter for screening for nutritional disorder  -     Vitamin D 25 Hydroxy; Future  4. Screening for deficiency anemia  -     CBC with Auto Differential; Future  5. Encounter for screening for lipid disorder  -     Lipid Panel; Future  6. Screening mammogram for high-risk patient  -     KonnectAgain REGINA DIGITAL SCREEN BILATERAL; Future  7. Skin tag  -     External Referral To Dermatology  8. Obesity (BMI 30-39.9)  -     Lipid Panel; Future  -     TSH + Free T4 Panel; Future  9. Medication refill  -     losartan (COZAAR) 50 MG tablet; Take 1 tablet by mouth daily, Disp-90 tablet, R-1Normal      Return in about 3 months (around 2024) for htn.         Subjective   SUBJECTIVE/OBJECTIVE:  Patient exercising.  Walks for 20 minutes twice a week.  Watching salt intake.     Need refills on her Losartan 50mg a day.  Patient feels Bp elevated d/t stress at home , but mostly at work.     Hypertension  This is a chronic problem. The current episode started more than 1 year ago. Pertinent negatives include no blurred vision, chest pain, headaches or shortness of breath. Past treatments include ACE inhibitors and diuretics. Compliance problems include medication side effects.    Back Pain  Pertinent negatives include no chest pain, fever

## 2024-01-29 NOTE — PROGRESS NOTES
\"Have you been to the ER, urgent care clinic since your last visit?  Hospitalized since your last visit?\"    YES - When: approximately 3 months ago.  Where and Why: [facility] for back pain - went 3 times.    “Have you seen or consulted any other health care providers outside of [facility] since your last visit?”    NO

## 2024-02-16 ENCOUNTER — HOSPITAL ENCOUNTER (OUTPATIENT)
Dept: WOMENS IMAGING | Facility: HOSPITAL | Age: 44
Discharge: HOME | End: 2024-02-16
Payer: COMMERCIAL

## 2024-02-16 DIAGNOSIS — Z12.31 SCREENING MAMMOGRAM FOR HIGH-RISK PATIENT: ICD-10-CM

## 2024-02-16 PROCEDURE — 77063 BREAST TOMOSYNTHESIS BI: CPT

## 2024-04-25 SDOH — ECONOMIC STABILITY: FOOD INSECURITY: WITHIN THE PAST 12 MONTHS, THE FOOD YOU BOUGHT JUST DIDN'T LAST AND YOU DIDN'T HAVE MONEY TO GET MORE.: SOMETIMES TRUE

## 2024-04-25 SDOH — ECONOMIC STABILITY: INCOME INSECURITY: HOW HARD IS IT FOR YOU TO PAY FOR THE VERY BASICS LIKE FOOD, HOUSING, MEDICAL CARE, AND HEATING?: SOMEWHAT HARD

## 2024-04-25 SDOH — ECONOMIC STABILITY: HOUSING INSECURITY
IN THE LAST 12 MONTHS, WAS THERE A TIME WHEN YOU DID NOT HAVE A STEADY PLACE TO SLEEP OR SLEPT IN A SHELTER (INCLUDING NOW)?: NO

## 2024-04-25 SDOH — ECONOMIC STABILITY: TRANSPORTATION INSECURITY
IN THE PAST 12 MONTHS, HAS LACK OF TRANSPORTATION KEPT YOU FROM MEETINGS, WORK, OR FROM GETTING THINGS NEEDED FOR DAILY LIVING?: NO

## 2024-04-25 SDOH — ECONOMIC STABILITY: FOOD INSECURITY: WITHIN THE PAST 12 MONTHS, YOU WORRIED THAT YOUR FOOD WOULD RUN OUT BEFORE YOU GOT MONEY TO BUY MORE.: SOMETIMES TRUE

## 2024-05-08 ENCOUNTER — OFFICE VISIT (OUTPATIENT)
Facility: CLINIC | Age: 44
End: 2024-05-08
Payer: COMMERCIAL

## 2024-05-08 VITALS
RESPIRATION RATE: 13 BRPM | WEIGHT: 231.8 LBS | DIASTOLIC BLOOD PRESSURE: 105 MMHG | HEIGHT: 64 IN | OXYGEN SATURATION: 97 % | SYSTOLIC BLOOD PRESSURE: 152 MMHG | BODY MASS INDEX: 39.57 KG/M2 | HEART RATE: 93 BPM | TEMPERATURE: 97.1 F

## 2024-05-08 DIAGNOSIS — E78.00 ELEVATED LDL CHOLESTEROL LEVEL: ICD-10-CM

## 2024-05-08 DIAGNOSIS — R73.03 PREDIABETES: ICD-10-CM

## 2024-05-08 DIAGNOSIS — I10 ESSENTIAL HYPERTENSION: Primary | ICD-10-CM

## 2024-05-08 DIAGNOSIS — E55.9 VITAMIN D DEFICIENCY: ICD-10-CM

## 2024-05-08 PROCEDURE — 3077F SYST BP >= 140 MM HG: CPT | Performed by: FAMILY MEDICINE

## 2024-05-08 PROCEDURE — 3080F DIAST BP >= 90 MM HG: CPT | Performed by: FAMILY MEDICINE

## 2024-05-08 PROCEDURE — 99214 OFFICE O/P EST MOD 30 MIN: CPT | Performed by: FAMILY MEDICINE

## 2024-05-08 RX ORDER — LOSARTAN POTASSIUM AND HYDROCHLOROTHIAZIDE 12.5; 5 MG/1; MG/1
1 TABLET ORAL DAILY
Qty: 90 TABLET | Refills: 1 | Status: SHIPPED | OUTPATIENT
Start: 2024-05-08

## 2024-05-08 RX ORDER — ERGOCALCIFEROL 1.25 MG/1
50000 CAPSULE ORAL WEEKLY
Qty: 12 CAPSULE | Refills: 1 | Status: SHIPPED | OUTPATIENT
Start: 2024-05-08

## 2024-05-08 ASSESSMENT — PATIENT HEALTH QUESTIONNAIRE - PHQ9
SUM OF ALL RESPONSES TO PHQ9 QUESTIONS 1 & 2: 1
2. FEELING DOWN, DEPRESSED OR HOPELESS: NOT AT ALL
SUM OF ALL RESPONSES TO PHQ QUESTIONS 1-9: 1
1. LITTLE INTEREST OR PLEASURE IN DOING THINGS: SEVERAL DAYS
SUM OF ALL RESPONSES TO PHQ QUESTIONS 1-9: 1

## 2024-05-08 NOTE — PROGRESS NOTES
\"Have you been to the ER, urgent care clinic since your last visit?  Hospitalized since your last visit?\"    NO    “Have you seen or consulted any other health care providers outside of [facility] since your last visit?”    NO            Click Here for Release of Records Request   
months.        Past Medical History:   Diagnosis Date    Hypertension     Sciatica     Seasonal allergies      Past Surgical History:   Procedure Laterality Date     SECTION  2000    Twins delivery    HYSTERECTOMY (CERVIX STATUS UNKNOWN)      PARTIAL HYSTERECTOMY (CERVIX NOT REMOVED)  10/2014    TUBAL LIGATION  2010     Current Outpatient Medications   Medication Sig    losartan-hydroCHLOROthiazide (HYZAAR) 50-12.5 MG per tablet Take 1 tablet by mouth daily    vitamin D (ERGOCALCIFEROL) 1.25 MG (36515 UT) CAPS capsule Take 1 capsule by mouth once a week     No current facility-administered medications for this visit.         Allergies and Intolerances:   No Known Allergies    Family History:   Family History   Problem Relation Age of Onset    Hypertension Mother     Diabetes type 2  Mother     Diabetes Mother     High Blood Pressure Mother     Hypertension Father     Diabetes type 2  Father     No Known Problems Sister     No Known Problems Sister     No Known Problems Brother     No Known Problems Brother     Breast Cancer Maternal Aunt         unknown age       Social History:   She  reports that she has never smoked. She has never been exposed to tobacco smoke. She has never used smokeless tobacco.  She  reports current alcohol use of about 5.0 standard drinks of alcohol per week.    Review of Systems   Constitutional:  Negative for chills, fatigue and fever.   Eyes:  Negative for visual disturbance.   Respiratory:  Negative for cough and shortness of breath.    Cardiovascular:  Negative for chest pain, palpitations and leg swelling.   Gastrointestinal:  Negative for abdominal pain, blood in stool, nausea and vomiting.   Genitourinary:  Negative for difficulty urinating.   Neurological:  Negative for dizziness, numbness and headaches.          Objective  BP (!) 152/105 (Site: Right Upper Arm, Position: Sitting, Cuff Size: Large Adult)   Pulse 93   Temp 97.1 °F (36.2 °C) (Temporal)   Resp 13

## 2024-09-20 ENCOUNTER — OFFICE VISIT (OUTPATIENT)
Facility: CLINIC | Age: 44
End: 2024-09-20
Payer: COMMERCIAL

## 2024-09-20 VITALS
SYSTOLIC BLOOD PRESSURE: 138 MMHG | DIASTOLIC BLOOD PRESSURE: 86 MMHG | HEART RATE: 89 BPM | OXYGEN SATURATION: 100 % | WEIGHT: 230 LBS | HEIGHT: 64 IN | TEMPERATURE: 96.8 F | BODY MASS INDEX: 39.27 KG/M2 | RESPIRATION RATE: 13 BRPM

## 2024-09-20 DIAGNOSIS — I10 BENIGN ESSENTIAL HTN: ICD-10-CM

## 2024-09-20 DIAGNOSIS — E55.9 VITAMIN D DEFICIENCY: ICD-10-CM

## 2024-09-20 DIAGNOSIS — E66.9 OBESITY (BMI 30-39.9): ICD-10-CM

## 2024-09-20 DIAGNOSIS — R73.03 PREDIABETES: Primary | ICD-10-CM

## 2024-09-20 PROCEDURE — 3075F SYST BP GE 130 - 139MM HG: CPT | Performed by: FAMILY MEDICINE

## 2024-09-20 PROCEDURE — 99214 OFFICE O/P EST MOD 30 MIN: CPT | Performed by: FAMILY MEDICINE

## 2024-09-20 PROCEDURE — 3079F DIAST BP 80-89 MM HG: CPT | Performed by: FAMILY MEDICINE

## 2024-09-20 ASSESSMENT — PATIENT HEALTH QUESTIONNAIRE - PHQ9
SUM OF ALL RESPONSES TO PHQ QUESTIONS 1-9: 0
1. LITTLE INTEREST OR PLEASURE IN DOING THINGS: NOT AT ALL
SUM OF ALL RESPONSES TO PHQ9 QUESTIONS 1 & 2: 0
SUM OF ALL RESPONSES TO PHQ QUESTIONS 1-9: 0
2. FEELING DOWN, DEPRESSED OR HOPELESS: NOT AT ALL

## 2024-09-20 ASSESSMENT — ENCOUNTER SYMPTOMS
SHORTNESS OF BREATH: 0
VOMITING: 0
COUGH: 0
ABDOMINAL PAIN: 0
BLOOD IN STOOL: 0
NAUSEA: 0

## 2024-10-04 ENCOUNTER — OFFICE VISIT (OUTPATIENT)
Facility: CLINIC | Age: 44
End: 2024-10-04

## 2024-10-04 VITALS
DIASTOLIC BLOOD PRESSURE: 85 MMHG | BODY MASS INDEX: 39.13 KG/M2 | WEIGHT: 229.2 LBS | HEART RATE: 107 BPM | TEMPERATURE: 97.2 F | HEIGHT: 64 IN | OXYGEN SATURATION: 99 % | SYSTOLIC BLOOD PRESSURE: 128 MMHG | RESPIRATION RATE: 17 BRPM

## 2024-10-04 DIAGNOSIS — L91.8 SKIN TAG: Primary | ICD-10-CM

## 2024-10-04 SDOH — ECONOMIC STABILITY: INCOME INSECURITY: HOW HARD IS IT FOR YOU TO PAY FOR THE VERY BASICS LIKE FOOD, HOUSING, MEDICAL CARE, AND HEATING?: NOT HARD AT ALL

## 2024-10-04 SDOH — ECONOMIC STABILITY: FOOD INSECURITY: WITHIN THE PAST 12 MONTHS, YOU WORRIED THAT YOUR FOOD WOULD RUN OUT BEFORE YOU GOT MONEY TO BUY MORE.: NEVER TRUE

## 2024-10-04 SDOH — ECONOMIC STABILITY: FOOD INSECURITY: WITHIN THE PAST 12 MONTHS, THE FOOD YOU BOUGHT JUST DIDN'T LAST AND YOU DIDN'T HAVE MONEY TO GET MORE.: NEVER TRUE

## 2024-10-04 ASSESSMENT — PATIENT HEALTH QUESTIONNAIRE - PHQ9
SUM OF ALL RESPONSES TO PHQ QUESTIONS 1-9: 0
SUM OF ALL RESPONSES TO PHQ QUESTIONS 1-9: 0
1. LITTLE INTEREST OR PLEASURE IN DOING THINGS: NOT AT ALL
SUM OF ALL RESPONSES TO PHQ QUESTIONS 1-9: 0
2. FEELING DOWN, DEPRESSED OR HOPELESS: NOT AT ALL
SUM OF ALL RESPONSES TO PHQ QUESTIONS 1-9: 0
SUM OF ALL RESPONSES TO PHQ9 QUESTIONS 1 & 2: 0

## 2025-08-05 ENCOUNTER — OFFICE VISIT (OUTPATIENT)
Facility: CLINIC | Age: 45
End: 2025-08-05
Payer: COMMERCIAL

## 2025-08-05 VITALS
TEMPERATURE: 97 F | BODY MASS INDEX: 37.8 KG/M2 | OXYGEN SATURATION: 99 % | RESPIRATION RATE: 16 BRPM | SYSTOLIC BLOOD PRESSURE: 169 MMHG | DIASTOLIC BLOOD PRESSURE: 115 MMHG | HEIGHT: 64 IN | WEIGHT: 221.4 LBS | HEART RATE: 93 BPM

## 2025-08-05 DIAGNOSIS — H92.02 OTALGIA, LEFT EAR: Primary | ICD-10-CM

## 2025-08-05 DIAGNOSIS — E66.01 CLASS 2 SEVERE OBESITY DUE TO EXCESS CALORIES WITH SERIOUS COMORBIDITY AND BODY MASS INDEX (BMI) OF 38.0 TO 38.9 IN ADULT (HCC): ICD-10-CM

## 2025-08-05 DIAGNOSIS — I10 ESSENTIAL HYPERTENSION: ICD-10-CM

## 2025-08-05 DIAGNOSIS — E66.812 CLASS 2 SEVERE OBESITY DUE TO EXCESS CALORIES WITH SERIOUS COMORBIDITY AND BODY MASS INDEX (BMI) OF 38.0 TO 38.9 IN ADULT (HCC): ICD-10-CM

## 2025-08-05 DIAGNOSIS — E55.9 VITAMIN D DEFICIENCY: ICD-10-CM

## 2025-08-05 DIAGNOSIS — H61.21 RIGHT EAR IMPACTED CERUMEN: ICD-10-CM

## 2025-08-05 PROCEDURE — 3077F SYST BP >= 140 MM HG: CPT | Performed by: FAMILY MEDICINE

## 2025-08-05 PROCEDURE — 3080F DIAST BP >= 90 MM HG: CPT | Performed by: FAMILY MEDICINE

## 2025-08-05 PROCEDURE — 69210 REMOVE IMPACTED EAR WAX UNI: CPT | Performed by: FAMILY MEDICINE

## 2025-08-05 PROCEDURE — 99213 OFFICE O/P EST LOW 20 MIN: CPT | Performed by: FAMILY MEDICINE

## 2025-08-05 RX ORDER — NEOMYCIN SULFATE, POLYMYXIN B SULFATE AND HYDROCORTISONE 3.5; 10000; 1 MG/ML; [IU]/ML; MG/ML
SOLUTION AURICULAR (OTIC)
Qty: 10 ML | Refills: 0 | Status: SHIPPED | OUTPATIENT
Start: 2025-08-05

## 2025-08-05 RX ORDER — ERGOCALCIFEROL 1.25 MG/1
50000 CAPSULE, LIQUID FILLED ORAL WEEKLY
Qty: 12 CAPSULE | Refills: 1 | Status: SHIPPED | OUTPATIENT
Start: 2025-08-05

## 2025-08-05 RX ORDER — LOSARTAN POTASSIUM AND HYDROCHLOROTHIAZIDE 12.5; 5 MG/1; MG/1
1 TABLET ORAL DAILY
Qty: 90 TABLET | Refills: 1 | Status: SHIPPED | OUTPATIENT
Start: 2025-08-05

## 2025-08-05 RX ORDER — AMOXICILLIN 875 MG/1
875 TABLET, COATED ORAL EVERY 12 HOURS
COMMUNITY
Start: 2025-08-02

## 2025-08-05 SDOH — ECONOMIC STABILITY: FOOD INSECURITY: WITHIN THE PAST 12 MONTHS, THE FOOD YOU BOUGHT JUST DIDN'T LAST AND YOU DIDN'T HAVE MONEY TO GET MORE.: NEVER TRUE

## 2025-08-05 SDOH — ECONOMIC STABILITY: FOOD INSECURITY: WITHIN THE PAST 12 MONTHS, YOU WORRIED THAT YOUR FOOD WOULD RUN OUT BEFORE YOU GOT MONEY TO BUY MORE.: NEVER TRUE

## 2025-08-05 ASSESSMENT — PATIENT HEALTH QUESTIONNAIRE - PHQ9
SUM OF ALL RESPONSES TO PHQ QUESTIONS 1-9: 0
2. FEELING DOWN, DEPRESSED OR HOPELESS: NOT AT ALL
SUM OF ALL RESPONSES TO PHQ QUESTIONS 1-9: 0
1. LITTLE INTEREST OR PLEASURE IN DOING THINGS: NOT AT ALL

## 2025-08-05 ASSESSMENT — ENCOUNTER SYMPTOMS
SHORTNESS OF BREATH: 0
VOMITING: 0
BLOOD IN STOOL: 0
ABDOMINAL PAIN: 0
NAUSEA: 0

## 2025-08-06 PROBLEM — I10 ESSENTIAL HYPERTENSION: Status: ACTIVE | Noted: 2025-08-06

## 2025-08-06 PROBLEM — E55.9 VITAMIN D DEFICIENCY: Status: ACTIVE | Noted: 2025-08-06

## 2025-08-06 ASSESSMENT — ENCOUNTER SYMPTOMS
RHINORRHEA: 1
COUGH: 1
SINUS PAIN: 1

## 2025-08-22 ENCOUNTER — OFFICE VISIT (OUTPATIENT)
Facility: CLINIC | Age: 45
End: 2025-08-22
Payer: COMMERCIAL

## 2025-08-22 VITALS
RESPIRATION RATE: 16 BRPM | DIASTOLIC BLOOD PRESSURE: 94 MMHG | HEART RATE: 88 BPM | WEIGHT: 228.2 LBS | OXYGEN SATURATION: 99 % | SYSTOLIC BLOOD PRESSURE: 143 MMHG | TEMPERATURE: 97.3 F | HEIGHT: 64 IN | BODY MASS INDEX: 38.96 KG/M2

## 2025-08-22 DIAGNOSIS — J30.89 ENVIRONMENTAL AND SEASONAL ALLERGIES: Primary | ICD-10-CM

## 2025-08-22 DIAGNOSIS — E66.09 CLASS 2 OBESITY DUE TO EXCESS CALORIES WITHOUT SERIOUS COMORBIDITY WITH BODY MASS INDEX (BMI) OF 39.0 TO 39.9 IN ADULT: ICD-10-CM

## 2025-08-22 DIAGNOSIS — E66.812 CLASS 2 OBESITY DUE TO EXCESS CALORIES WITHOUT SERIOUS COMORBIDITY WITH BODY MASS INDEX (BMI) OF 39.0 TO 39.9 IN ADULT: ICD-10-CM

## 2025-08-22 PROCEDURE — 3077F SYST BP >= 140 MM HG: CPT | Performed by: FAMILY MEDICINE

## 2025-08-22 PROCEDURE — 3080F DIAST BP >= 90 MM HG: CPT | Performed by: FAMILY MEDICINE

## 2025-08-22 PROCEDURE — 99213 OFFICE O/P EST LOW 20 MIN: CPT | Performed by: FAMILY MEDICINE

## 2025-08-22 RX ORDER — CETIRIZINE HYDROCHLORIDE 10 MG/1
10 TABLET ORAL DAILY
Qty: 90 TABLET | Refills: 3 | Status: SHIPPED | OUTPATIENT
Start: 2025-08-22

## 2025-08-22 RX ORDER — FLUTICASONE PROPIONATE 50 MCG
1 SPRAY, SUSPENSION (ML) NASAL DAILY
Qty: 16 G | Refills: 5 | Status: SHIPPED | OUTPATIENT
Start: 2025-08-22

## 2025-08-22 RX ORDER — CETIRIZINE HYDROCHLORIDE, PSEUDOEPHEDRINE HYDROCHLORIDE 5; 120 MG/1; MG/1
1 TABLET, FILM COATED, EXTENDED RELEASE ORAL 2 TIMES DAILY
Qty: 20 TABLET | Refills: 0 | Status: SHIPPED | OUTPATIENT
Start: 2025-08-22 | End: 2025-09-01

## 2025-08-22 SDOH — ECONOMIC STABILITY: FOOD INSECURITY: WITHIN THE PAST 12 MONTHS, YOU WORRIED THAT YOUR FOOD WOULD RUN OUT BEFORE YOU GOT MONEY TO BUY MORE.: NEVER TRUE

## 2025-08-22 SDOH — ECONOMIC STABILITY: FOOD INSECURITY: WITHIN THE PAST 12 MONTHS, THE FOOD YOU BOUGHT JUST DIDN'T LAST AND YOU DIDN'T HAVE MONEY TO GET MORE.: NEVER TRUE

## 2025-08-22 ASSESSMENT — PATIENT HEALTH QUESTIONNAIRE - PHQ9
SUM OF ALL RESPONSES TO PHQ QUESTIONS 1-9: 0
1. LITTLE INTEREST OR PLEASURE IN DOING THINGS: NOT AT ALL
2. FEELING DOWN, DEPRESSED OR HOPELESS: NOT AT ALL
SUM OF ALL RESPONSES TO PHQ QUESTIONS 1-9: 0